# Patient Record
Sex: MALE
[De-identification: names, ages, dates, MRNs, and addresses within clinical notes are randomized per-mention and may not be internally consistent; named-entity substitution may affect disease eponyms.]

---

## 2017-02-06 ENCOUNTER — HOSPITAL (OUTPATIENT)
Dept: OTHER | Age: 22
End: 2017-02-06
Attending: FAMILY MEDICINE

## 2018-05-14 ENCOUNTER — OFFICE (OUTPATIENT)
Dept: URBAN - METROPOLITAN AREA CLINIC 33 | Facility: CLINIC | Age: 23
End: 2018-05-14

## 2018-05-14 VITALS
DIASTOLIC BLOOD PRESSURE: 78 MMHG | WEIGHT: 140 LBS | HEART RATE: 87 BPM | SYSTOLIC BLOOD PRESSURE: 113 MMHG | HEIGHT: 67 IN

## 2018-05-14 DIAGNOSIS — R19.7 DIARRHEA (UNSPECIFIED): ICD-10-CM

## 2018-05-14 DIAGNOSIS — R11.2 NAUSEA & VOMITING: ICD-10-CM

## 2018-05-14 PROCEDURE — 99203 OFFICE O/P NEW LOW 30 MIN: CPT | Performed by: INTERNAL MEDICINE

## 2018-05-14 RX ORDER — ONDANSETRON HYDROCHLORIDE 4 MG/1
TABLET, FILM COATED ORAL
Qty: 30 | Status: ACTIVE
Start: 2018-05-14

## 2018-05-14 RX ORDER — OMEPRAZOLE 20 MG/1
40 CAPSULE, DELAYED RELEASE ORAL
Qty: 60 | Status: ACTIVE
Start: 2018-05-14

## 2019-06-12 ENCOUNTER — HOSPITAL ENCOUNTER (INPATIENT)
Dept: HOSPITAL 54 - DS | Age: 24
Discharge: HOME | DRG: 512 | End: 2019-06-12
Attending: SPECIALIST | Admitting: SPECIALIST
Payer: COMMERCIAL

## 2019-06-12 VITALS — SYSTOLIC BLOOD PRESSURE: 120 MMHG | DIASTOLIC BLOOD PRESSURE: 63 MMHG

## 2019-06-12 VITALS — SYSTOLIC BLOOD PRESSURE: 144 MMHG | DIASTOLIC BLOOD PRESSURE: 75 MMHG

## 2019-06-12 VITALS — SYSTOLIC BLOOD PRESSURE: 144 MMHG | DIASTOLIC BLOOD PRESSURE: 91 MMHG

## 2019-06-12 VITALS — DIASTOLIC BLOOD PRESSURE: 78 MMHG | SYSTOLIC BLOOD PRESSURE: 150 MMHG

## 2019-06-12 VITALS — SYSTOLIC BLOOD PRESSURE: 145 MMHG | DIASTOLIC BLOOD PRESSURE: 78 MMHG
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
## 2019-06-12 DIAGNOSIS — S52.502A: Primary | ICD-10-CM
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
## 2019-06-12 PROCEDURE — 0PSJ04Z REPOSITION LEFT RADIUS WITH INTERNAL FIXATION DEVICE, OPEN APPROACH: ICD-10-PCS | Performed by: SPECIALIST

## 2019-06-12 PROCEDURE — G0378 HOSPITAL OBSERVATION PER HR: HCPCS

## 2019-06-12 NOTE — NUR
RENETTA

ARRIVED FROM HOME, AMBULATORY ACCOMPANIED BY GIRLFRIEND WITH RIGHT DISTAL RADIUS FRACTURE. 
SITE WITH SPLINT WRAPPED WITH ACE WRAP.  BILATERAL LUNG FIELDS CLEAR, NO SOB.  PAIN 
TOLERABLE ON AFFECTED EXTREMITY. CONSENTED FOR ORIF OF RIGHT RADIUS.  KEPT NPO, CHECKLIST 
DONE.KEPT COMFORTABLE.

-------------------------------------------------------------------------------

Addendum: 06/12/19 at 0756 by MARIA REYES RN

-------------------------------------------------------------------------------

CAME WITH LEFT RADIUS FRACTURE. NOT RIGHT.

## 2019-06-12 NOTE — NUR
MS/RN - PA Order

Patient c/o itchiness noted rashes on his nose, chin, and chest area. Oscar Hinkle, 
obtained an order to give Benadryl 25 mg IVP Q6H PRN.

## 2019-06-12 NOTE — NUR
MS/RN - Discharge 

Patient alert and oriented, rashes on chest, nose, and chin area noted with improvement post 
Benadryl administration. Patient discharged home in stable condition, remain afebrile, 
denies any pain, no c/o dizziness, not in any form of distress. Reviewed discharge 
instructions with patient and he verbalized full understanding of all teachings including 
non weight bearing on LUE and follow-up care with Dr. Yadav within 2 weeks. Seek immediate 
medical attention for worsening symptoms, chest pain, shortness of breath, palpitations, 
intractable nausea and vomiting, diarrhea, weakness, loss of consciousness, neurological 
deficit, or any other emergent concerns. All belongings with patient and he deny any missing 
items. Saline lock removed on the right hand with catheter tip intact, no redness, no 
swelling noted at the site. Discharge paperwork signed and copies were given per protocol. 
Accompanied to the lobby via wheelchair and transported by private car by girlfriend.

## 2019-06-12 NOTE — NUR
MS/RN - Notes

Received patient from PACU, s/p left distal radius ORIF, awake, A/O x 4, no apparent 
distress seen, left arm sling in place. Post-op vitals done per protocol. Per Dr. Yadav, 
discharge patient when stable, NWB on LUE, follow up with him in 2 weeks, start on regular 
diet, and give Norco 10/325 1 tab po once prior to discharge.

## 2019-06-12 NOTE — NUR
MS/RN - Notes

Patient taken to OR for procedure, pre-op checklist completed, consent signed, endorsed 
accordingly.

## 2019-06-12 NOTE — NUR
MS/RN - Assessment

Patient is A/O x 4, here for left distal radius fracture under the care of Dr. Pino. Left 
forearm splint in place. Patient able to move and wiggle his fingers, denies numbness or 
tingling sensation. Patient was educated regarding the importance of non weight bearing on 
the LFA. Saline lock on the right hand is patent, intact with no signs of infiltration. 
Currently on NPO for left distal radius ORIF. Fall precautions maintained. All needs 
attended and met. Will continue to monitor closely.
